# Patient Record
Sex: FEMALE | Race: WHITE | NOT HISPANIC OR LATINO | Employment: UNEMPLOYED | URBAN - METROPOLITAN AREA
[De-identification: names, ages, dates, MRNs, and addresses within clinical notes are randomized per-mention and may not be internally consistent; named-entity substitution may affect disease eponyms.]

---

## 2018-05-31 ENCOUNTER — HISTORICAL (OUTPATIENT)
Dept: RADIOLOGY | Facility: HOSPITAL | Age: 58
End: 2018-05-31

## 2019-07-12 ENCOUNTER — HISTORICAL (OUTPATIENT)
Dept: RADIOLOGY | Facility: HOSPITAL | Age: 59
End: 2019-07-12

## 2019-09-23 ENCOUNTER — HISTORICAL (OUTPATIENT)
Dept: RADIOLOGY | Facility: HOSPITAL | Age: 59
End: 2019-09-23

## 2020-07-29 LAB
ABS NEUT (OLG): 5.2 X10(3)/MCL (ref 1.5–6.9)
ALBUMIN SERPL-MCNC: 4.1 GM/DL (ref 3.5–5)
APTT PPP: 29 SEC (ref 23.4–34.9)
BASOPHILS # BLD AUTO: 0 X10(3)/MCL (ref 0–0.1)
BASOPHILS NFR BLD AUTO: 1 % (ref 0–1)
BUN SERPL-MCNC: 9 MG/DL (ref 9.8–20.1)
CALCIUM SERPL-MCNC: 9.5 MG/DL (ref 8.4–10.2)
CHLORIDE SERPL-SCNC: 103 MMOL/L (ref 98–107)
CO2 SERPL-SCNC: 31 MMOL/L (ref 22–29)
CREAT SERPL-MCNC: 0.87 MG/DL (ref 0.55–1.02)
EOSINOPHIL # BLD AUTO: 0.2 X10(3)/MCL (ref 0–0.6)
EOSINOPHIL NFR BLD AUTO: 2 % (ref 0–5)
ERYTHROCYTE [DISTWIDTH] IN BLOOD BY AUTOMATED COUNT: 12.8 % (ref 11.5–17)
GLUCOSE SERPL-MCNC: 118 MG/DL (ref 74–100)
HCT VFR BLD AUTO: 41.9 % (ref 36–48)
HGB BLD-MCNC: 14.2 GM/DL (ref 12–16)
IMM GRANULOCYTES # BLD AUTO: 0.04 10*3/UL (ref 0–0.02)
IMM GRANULOCYTES NFR BLD AUTO: 0.5 % (ref 0–0.43)
INR PPP: 0.9 (ref 2–3)
LYMPHOCYTES # BLD AUTO: 2.5 X10(3)/MCL (ref 0.5–4.1)
LYMPHOCYTES NFR BLD AUTO: 29 % (ref 15–40)
MCH RBC QN AUTO: 32 PG (ref 27–34)
MCHC RBC AUTO-ENTMCNC: 34 GM/DL (ref 31–36)
MCV RBC AUTO: 96 FL (ref 80–99)
MONOCYTES # BLD AUTO: 0.6 X10(3)/MCL (ref 0–1.1)
MONOCYTES NFR BLD AUTO: 7 % (ref 4–12)
NEUTROPHILS # BLD AUTO: 5.2 X10(3)/MCL (ref 1.5–6.9)
NEUTROPHILS NFR BLD AUTO: 60 % (ref 43–75)
PHOSPHATE SERPL-MCNC: 3.1 MG/DL (ref 2.3–4.7)
PLATELET # BLD AUTO: 276 X10(3)/MCL (ref 140–400)
PMV BLD AUTO: 9.2 FL (ref 6.8–10)
POTASSIUM SERPL-SCNC: 3.5 MMOL/L (ref 3.5–5.1)
PROTHROMBIN TIME: 12.1 SEC (ref 11.7–14.5)
RBC # BLD AUTO: 4.38 X10(6)/MCL (ref 4.2–5.4)
SARS-COV-2 RNA RESP QL NAA+PROBE: NOT DETECTED
SODIUM SERPL-SCNC: 142 MMOL/L (ref 136–145)
WBC # SPEC AUTO: 8.6 X10(3)/MCL (ref 4.5–11.5)

## 2020-08-03 ENCOUNTER — HISTORICAL (OUTPATIENT)
Dept: ANESTHESIOLOGY | Facility: HOSPITAL | Age: 60
End: 2020-08-03

## 2020-08-17 ENCOUNTER — HISTORICAL (OUTPATIENT)
Dept: ANESTHESIOLOGY | Facility: HOSPITAL | Age: 60
End: 2020-08-17

## 2021-08-17 ENCOUNTER — HISTORICAL (OUTPATIENT)
Dept: RADIOLOGY | Facility: HOSPITAL | Age: 61
End: 2021-08-17

## 2022-05-02 NOTE — HISTORICAL [facility]ER
This is a historical note converted from [facility]. Formatting and pictures may have been removed.  Please reference [facility] for original formatting and attached multimedia. Preoperative Diagnosis  Nuclear sclerotic cataract left eye  Postoperative Diagnosis  Same  Operation  Phacoemulsification [left] eye with posterior chamber intraocular lens implantation.?  After the appropriate informed consent was obtained the patient was brought to the holding area. Anesthetic and dilating drops were applied. ?The patient was then brought to the operating suite and placed in supine position for ophthalmic surgery. The area around the operative eye was then sterilely prepped and draped in the usual ophthalmic fashion. A lid speculum was placed in the conjunctival fornices of the eye. A 4-0 silk bridle suture was passed through the superior rectus muscle. The conjunctiva was taken down with a Alec scissors. The bipolar cautery was used to provide adequate hemostasis. A paracentesis was then made at the 2 oclock position and Omidria] was injected through the paracentesis. Approximately?1.0 mL of this solution was injected. A #69 blade was used to fashion a scleral groove approximately 2 mm posterior to the surgical limbus. This groove measured 2 mm in length. The #66 blade was then used to bevel the incision anterior to the clear cornea. The anterior chamber was entered with a phacotome blade through the scleral groove. ?Amvisc plus was introduced into the eye and a continuous circular capsular axis was performed with a bent cystitome needle. BSS on the cannula was then used to hydrodissect and hydrodelineate the knee lens nucleus. The lens nucleus was then removed by the divide and conquer technique with the phacoemulsification handpiece. After the nuclear material was removed, the irrigation and aspiration handpiece was introduced into the eye and the residual cortical material was removed. More Amvisc plus was introduced into  the eye and into the capsular bag. ?A 3 piece folded posterior chamber intraocular lens was then inserted through the scleral groove and into the capsular bag. The residual viscoelastic was removed with the irrigation and aspiration handpiece. BSS was then instilled through the paracentesis site and the wound was checked and found to be watertight. The 4-0 silk bridle suture was removed and the conjunctiva was reapproximated with bipolar cautery. One drop of Timoptic XE 0.5%, 1 drop of 2% pilocarpine and Maxitrol ointment were then placed in the eye at the termination of the surgery. The patient tolerated the procedure well without complications and was returned to outpatient surgery.  Surgeon(s)  Noah Castañeda MD  Anesthesia  Topical with MAC

## 2022-05-02 NOTE — HISTORICAL [facility]
This is a historical note converted from [facility]. Formatting and pictures may have been removed.  Please reference [facility] for original formatting and attached multimedia. Preoperative Diagnosis  Nuclear sclerotic cataract right eye  Postoperative Diagnosis  Same  Operation  Phacoemulsification [right] eye with posterior chamber intraocular lens implantation.?  After the appropriate informed consent was obtained the patient was brought to the holding area. Anesthetic and dilating drops were applied. ?The patient was then brought to the operating suite and placed in supine position for ophthalmic surgery. The area around the operative eye was then sterilely prepped and draped in the usual ophthalmic fashion. A lid speculum was placed in the conjunctival fornices of the eye. A 4-0 silk bridle suture was passed through the superior rectus muscle. The conjunctiva was taken down with a Alec scissors. The bipolar cautery was used to provide adequate hemostasis. A paracentesis was then made at the 2 oclock position and Omidria] was injected through the paracentesis. Approximately?1.0 mL of this solution was injected. A #69 blade was used to fashion a scleral groove approximately 2 mm posterior to the surgical limbus. This groove measured 2 mm in length. The #66 blade was then used to bevel the incision anterior to the clear cornea. The anterior chamber was entered with a phacotome blade through the scleral groove. ?Amvisc plus was introduced into the eye and a continuous circular capsular axis was performed with a bent cystitome needle. BSS on the cannula was then used to hydrodissect and hydrodelineate the knee lens nucleus. The lens nucleus was then removed by the divide and conquer technique with the phacoemulsification handpiece. After the nuclear material was removed, the irrigation and aspiration handpiece was introduced into the eye and the residual cortical material was removed. More Amvisc plus was introduced  into the eye and into the capsular bag. ?A 3 piece folded posterior chamber intraocular lens was then inserted through the scleral groove and into the capsular bag. The residual viscoelastic was removed with the irrigation and aspiration handpiece. BSS was then instilled through the paracentesis site and the wound was checked and found to be watertight. The 4-0 silk bridle suture was removed and the conjunctiva was reapproximated with bipolar cautery. One drop of Timoptic XE 0.5%, 1 drop of 2% pilocarpine and Maxitrol ointment were then placed in the eye at the termination of the surgery. The patient tolerated the procedure well without complications and was returned to outpatient surgery.  Surgeon(s)  Kari COX  Anesthesia  Topical with MAC

## 2022-08-15 DIAGNOSIS — Z12.31 ENCOUNTER FOR SCREENING MAMMOGRAM FOR BREAST CANCER: Primary | ICD-10-CM

## 2022-08-30 ENCOUNTER — HOSPITAL ENCOUNTER (OUTPATIENT)
Dept: RADIOLOGY | Facility: HOSPITAL | Age: 62
Discharge: HOME | End: 2022-08-30
Attending: OBSTETRICS & GYNECOLOGY
Payer: COMMERCIAL

## 2022-08-30 DIAGNOSIS — Z12.31 ENCOUNTER FOR SCREENING MAMMOGRAM FOR BREAST CANCER: ICD-10-CM

## 2022-08-30 PROCEDURE — 77067 SCR MAMMO BI INCL CAD: CPT | Mod: 26,,, | Performed by: STUDENT IN AN ORGANIZED HEALTH CARE EDUCATION/TRAINING PROGRAM

## 2022-08-30 PROCEDURE — 77067 SCR MAMMO BI INCL CAD: CPT | Mod: TC

## 2022-08-30 PROCEDURE — 77067 MAMMO DIGITAL SCREENING BILAT WITH TOMO: ICD-10-PCS | Mod: 26,,, | Performed by: STUDENT IN AN ORGANIZED HEALTH CARE EDUCATION/TRAINING PROGRAM

## 2022-08-30 PROCEDURE — 77063 BREAST TOMOSYNTHESIS BI: CPT | Mod: 26,,, | Performed by: STUDENT IN AN ORGANIZED HEALTH CARE EDUCATION/TRAINING PROGRAM

## 2022-08-30 PROCEDURE — 77063 MAMMO DIGITAL SCREENING BILAT WITH TOMO: ICD-10-PCS | Mod: 26,,, | Performed by: STUDENT IN AN ORGANIZED HEALTH CARE EDUCATION/TRAINING PROGRAM

## 2023-12-12 DIAGNOSIS — Z12.31 OTHER SCREENING MAMMOGRAM: Primary | ICD-10-CM

## 2023-12-15 ENCOUNTER — HOSPITAL ENCOUNTER (OUTPATIENT)
Dept: RADIOLOGY | Facility: HOSPITAL | Age: 63
Discharge: HOME | End: 2023-12-15
Attending: OBSTETRICS & GYNECOLOGY
Payer: COMMERCIAL

## 2023-12-15 DIAGNOSIS — Z12.31 OTHER SCREENING MAMMOGRAM: ICD-10-CM

## 2023-12-15 PROCEDURE — 77067 SCR MAMMO BI INCL CAD: CPT | Mod: 26,,, | Performed by: STUDENT IN AN ORGANIZED HEALTH CARE EDUCATION/TRAINING PROGRAM

## 2023-12-15 PROCEDURE — 77067 SCR MAMMO BI INCL CAD: CPT | Mod: TC

## 2023-12-15 PROCEDURE — 77067 MAMMO DIGITAL SCREENING BILAT WITH TOMO: ICD-10-PCS | Mod: 26,,, | Performed by: STUDENT IN AN ORGANIZED HEALTH CARE EDUCATION/TRAINING PROGRAM

## 2023-12-15 PROCEDURE — 77063 BREAST TOMOSYNTHESIS BI: CPT | Mod: 26,,, | Performed by: STUDENT IN AN ORGANIZED HEALTH CARE EDUCATION/TRAINING PROGRAM

## 2023-12-15 PROCEDURE — 77063 MAMMO DIGITAL SCREENING BILAT WITH TOMO: ICD-10-PCS | Mod: 26,,, | Performed by: STUDENT IN AN ORGANIZED HEALTH CARE EDUCATION/TRAINING PROGRAM

## 2025-04-11 ENCOUNTER — HOSPITAL ENCOUNTER (OUTPATIENT)
Dept: RADIOLOGY | Facility: HOSPITAL | Age: 65
Discharge: HOME | End: 2025-04-11
Attending: OBSTETRICS & GYNECOLOGY
Payer: COMMERCIAL

## 2025-04-11 DIAGNOSIS — Z12.31 ENCOUNTER FOR SCREENING MAMMOGRAM FOR BREAST CANCER: ICD-10-CM

## 2025-04-11 PROCEDURE — 77067 SCR MAMMO BI INCL CAD: CPT | Mod: TC
